# Patient Record
(demographics unavailable — no encounter records)

---

## 2025-06-23 NOTE — HISTORY OF PRESENT ILLNESS
[FreeTextEntry1] : June 23, 2025 Patient returns for follow up of Osteoporosis reclast 2/2022, 6/2023. 6/2024 Had fall in April, onto right lower back and hip pain with sitting, better with ambulation ambulating with cane Will try to obtain results from PMD, left message with office to discuss Patient is unable to log onto portal  Unclear if patient had repeat DEXA Restarted calcium and vitamin D Patient status post ankle replacement November 2022, exacerbated lymphedema since that time Was in wheelchair during that time +lymphedema Discussed calcium enriched diet, patient eats yogurt everyday No known family history of Osteoporosis, patient will reach out to her two sisters  February 7, 2024 Patient returns for follow up of osteoporosis Patent overall doing well. Continues reclast, completed 2/3 doses, due for next dose in June 2024 Tolerating well, no side effects noted. Ambulates with cane currently, during recovery used wheelchair and crutches for months Patient status post ankle replacement November 2022, exacerbated lymphedema since that time Wearing compression boots  Patient is currently taking levothyroxine, vitamin D3 Takes pain medication as needed No dental work planned, would like to have implants completed but not emergent need Discussed calcium enriched diet, patient eats yogurt everyday Discussed increasing walking and weight bearing exercises as tolerated y lymphedema and ankle pain  April 27, 2023 Patient returns for follow up Patient s/p left ankle reconstruction, 11/2022 In cam walker, lymphedema  Had surgery at [facility] Next follow up with surgeon in 11/2023 Walking but not as much as usual Reviewed bone density with decrease, which may be related to disuse as well as in cam walker given left ankle reconstruction surgery in 11/2022  Previous history: In 2009 had gastric bypass Reports history of osteoporosis, took alendronate in past, stopped in 2017, took for about 1-2 years PMD Fabrice Patricia, has ordered a repeat bone density DEXA in 2020: T score l spine 3.3 left femoral neck -2.3 Left hip -2.1

## 2025-06-23 NOTE — HISTORY OF PRESENT ILLNESS
[facility]  H&P  Name: Suhail Borrego 83 y.o. male I MRN: [id number]  Unit/Bed#: ED-39 I Date of Admission: 6/29/2024   Date of Service: 6/29/2024 I Hospital Day: 0      Assessment & Plan   * Lumbar compression fracture (HCC)  Assessment & Plan  PMHx of chronic back pain including T12 compression fracture and fusion of L2-S1 in 2018. Patient reports 1 week of worsening L sided back pain that feels like an achy muscle but is worse with standing/twisting or bending.     Tylenol 975 q8 OTC  Opioid naive pain management including Oxy 2.5 and Oxy 5 mg for pain  Bowel regimen due to constipation 2/2 tramadol outpatient  Continue gabapentin for neuropathic pain in feet  Neurosurgery consult for evaluation/management recommendations of new acute T11 compression fracture  PT/OT  Consider DEXA scan outpatient in setting of multiple pathologic fractures w/o trauma  Continue vitamin C and D supplementation    Stage 3 chronic kidney disease (HCC)  Assessment & Plan  Lab Results   Component Value Date    EGFR 57 06/29/2024    EGFR 59 05/19/2024    EGFR 56 05/02/2024    CREATININE 1.16 06/29/2024    CREATININE 1.14 05/19/2024    CREATININE 1.18 05/02/2024     Patient with chronic kidney disease IIIa. Avoid hypotension and nephrotoxins. Monitor on AM BMP.   IV hydration given s/p contrast for CTA dissection protocol.     Localized swelling of left lower extremity  Assessment & Plan  Per patient chronic, wears compression stocking on L side. Had LE US in 5/2024. Will repeat to ensure no interval DVT.     Primary hypertension  Assessment & Plan  BP stable. Continue home metoprolol 75 mg BID.    Hx of CABG  Assessment & Plan  History of CABG. Continue Aspirin and Statin.    Hyperlipidemia  Assessment & Plan  Continue home atorvastatin 80 mg    Leukocytosis  Assessment & Plan  Chronic since May 2024. Did not resolve with course of antibiotics. Monitor on CBC, follow up outpatient for further workup.         VTE Pharmacologic Prophylaxis: VTE Score: 4 Moderate Risk (Score 3-4) - Pharmacological DVT Prophylaxis Ordered: enoxaparin (Lovenox).  Code Status: Level 1 - Full Code  Discussion with family: Patient declined call to .     Anticipated Length of Stay: Patient will be admitted on an inpatient basis with an anticipated length of stay of greater than 2 midnights secondary to intractable back pain and acute T12 compression fracture.    Chief Complaint: Back pain    History of Present Illness:  Suhail Borrego is a 83 y.o. male with a PMH of HTN, HLD, PAD, CKD3, chronic low back pain including compression fracture of T12 in 2018 which healed on its own and lumbar spinal fusion L2-S1 in 2016 with Dr. Gil who presents with back pain. This admission with a T11 fracture.     Patient admitted 5/2/2024 for back pain. He was evaluated by neurosurgery at that time, who recommended conservative management. This was in the absence of acute fracture on CT from 3/29/2024. 5/6 was seen by pain management who initiated tramadol 50 mg BID PRN. Received bilateral L3-5 medial branch blocks with Dr. Braxton on 5/29/2024.     Today presented to ED with L lower back pain in the L lumbar region. Reports pain feels like muscle ache. Has been taking gabapentin and tramadol that was prescribed outpatient about 6 weeks prior to pain that he now associates with this fracture. Pain now refractory to medications. Reports pain is only when he stands up/walks, bends or twists.     In the ED, was given 250 mL bolus, and oxycodone 5 mg to good effect. Labwork remarkable for WBC of 10.58 (elevated 5/19/24), hgb 10.2 (baseline 10-11), and crt 1.16. CTA dissection protocol did not reveal acute dissection, however acute compression fracture at T11 vertebral body was noted with 10-20% of vertebral body height lost anteriorly.    Patient denies bowel or bladder incontinence, paresthesias in legs or groin. Has history of neuropathy in feet  for which he takes gabapentin for.     Headache, lightheadedness, palpitations, chest pain, N/V/D. Has low blood pressure at times.   Does have constipation, 3-4 days since last bowel movement. Only takes bowel regimen PRN. Also with chronic cough and shortness of breath. Has swelling in his left leg for which he wears a compression stocking. He was evaluated for DVT and none was found.     Review of Systems:  Review of Systems - As in HPI    Past Medical and Surgical History:   Past Medical History:   Diagnosis Date    Abnormal liver function test     RESOLVED: 14SEP2017    Allergic rhinitis     Anemia     LAST ASSESSED: 19OCT2017    Arthritis     BPH (benign prostatic hyperplasia)     CAD (coronary artery disease)     Coronary artery disease     Femoral artery stenosis (HCC)     LAST ASSESSED: 05OCT2017    Former tobacco use     GERD (gastroesophageal reflux disease)     Hand paresthesia     RESOLVED: 11SEP2017    Hyperlipidemia     Hypertension     Lumbar stenosis     Peripheral artery disease (HCC)     LAST ASSESSED: 27OCT2017    Stage 3a chronic kidney disease (HCC) 7/11/2021       Past Surgical History:   Procedure Laterality Date    BACK SURGERY      COLONOSCOPY      LUMBAR FUSION      IN ARTHRODESIS POSTERIOR/PSTLAT TQ 1NTRSPC LUMBAR N/A 11/03/2016    Procedure: L2-S1 POSTERIOR LUMBAR FUSION AND DECOMPRESSION (Impulse), Posterior lateral fixation; dural repair.;  Surgeon: Leslie Gil MD;  Location: [facility];  Service: Orthopedics    IN CABG W/ARTERIAL GRAFT SINGLE ARTERIAL GRAFT N/A 01/19/2018    Procedure: CABG X4 with LIMA - LAD, SVG - RCA, OM2, & Diagonal ; Left Leg EVH; MATTHEW;  Surgeon: Eric Bar DO;  Location: [facility];  Service: Cardiac Surgery    IN COLONOSCOPY FLX DX W/COLLJ SPEC WHEN PFRMD N/A 11/15/2017    Procedure: EGD AND COLONOSCOPY;  Surgeon: Mariano Godinez MD;  Location: [facility];  Service: Gastroenterology    SEPTOPLASTY      LAST ASSESSED; 13MAY2014    TONSILECTOMY AND  ADNOIDECTOMY      LAST ASSESSED: [id number]    UPPER GASTROINTESTINAL ENDOSCOPY         Meds/Allergies:  Prior to Admission medications    Medication Sig Start Date End Date Taking? Authorizing Provider   acetaminophen (TYLENOL) 650 mg CR tablet Take 650 mg by mouth every 8 (eight) hours as needed for mild pain    Historical Provider, MD   Ascorbic Acid (Vitamin C) 500 MG PACK Take 1,000 mg by mouth daily    Historical Provider, MD   aspirin (ECOTRIN LOW STRENGTH) 81 mg EC tablet Take 81 mg by mouth daily    Historical Provider, MD   atorvastatin (LIPITOR) 80 mg tablet TAKE 1 TABLET BY MOUTH EVERY DAY IN THE MORNING 2/8/24   Nile Nash MD   cholecalciferol (VITAMIN D3) 1,000 units tablet Take 2,000 Units by mouth daily    Historical Provider, MD   clotrimazole-betamethasone (LOTRISONE) 1-0.05 % cream Apply topically 2 (two) times a day 7/21/23   Aly Ziegler PA-C   cyanocobalamin (VITAMIN B-12) 1,000 mcg tablet Take 1,000 mcg by mouth daily      Historical Provider, MD   docusate sodium (COLACE) 100 mg capsule Take 100 mg by mouth 2 (two) times a day    Historical Provider, MD   famotidine (PEPCID) 20 mg tablet TAKE 1 TABLET BY MOUTH TWICE A DAY 6/16/24   Mikaela Duenas,    folic acid (FOLVITE) 1 mg tablet Take 1 tablet (1 mg total) by mouth daily 5/2/24   Phill Kinsey,    gabapentin (NEURONTIN) 100 mg capsule Take 1 capsule (100 mg total) by mouth daily at bedtime 5/23/24   More Mcdermott,    metoprolol tartrate (LOPRESSOR) 50 mg tablet Take 1.5 tablets (75 mg total) by mouth every 12 (twelve) hours 5/20/24   Nile Nash MD   Multiple Vitamin (MULTIVITAMIN) capsule Take 1 capsule by mouth daily    Historical Provider, MD   traMADol (Ultram) 50 mg tablet Take 1 tablet (50 mg total) by mouth 2 (two) times a day as needed for moderate pain 5/20/24   Eliazar Braxton MD     I have reviewed home medications with patient personally.    Allergies:   Allergies   Allergen Reactions    Penicillins  [FreeTextEntry1] : June 23, 2025 Patient returns for follow up of Osteoporosis reclast 2/2022, 6/2023. 6/2024 Had fall in April, onto right lower back and hip pain with sitting, better with ambulation ambulating with cane Will try to obtain results from PMD, left message with office to discuss Patient is unable to log onto portal  Unclear if patient had repeat DEXA Restarted calcium and vitamin D Patient status post ankle replacement November 2022, exacerbated lymphedema since that time Was in wheelchair during that time +lymphedema Discussed calcium enriched diet, patient eats yogurt everyday No known family history of Osteoporosis, patient will reach out to her two sisters  February 7, 2024 Patient returns for follow up of osteoporosis Patent overall doing well. Continues reclast, completed 2/3 doses, due for next dose in June 2024 Tolerating well, no side effects noted. Ambulates with cane currently, during recovery used wheelchair and crutches for months Patient status post ankle replacement November 2022, exacerbated lymphedema since that time Wearing compression boots  Patient is currently taking levothyroxine, vitamin D3 Takes pain medication as needed No dental work planned, would like to have implants completed but not emergent need Discussed calcium enriched diet, patient eats yogurt everyday Discussed increasing walking and weight bearing exercises as tolerated y lymphedema and ankle pain  April 27, 2023 Patient returns for follow up Patient s/p left ankle reconstruction, 11/2022 In cam walker, lymphedema  Had surgery at [facility] Next follow up with surgeon in 11/2023 Walking but not as much as usual Reviewed bone density with decrease, which may be related to disuse as well as in cam walker given left ankle reconstruction surgery in 11/2022  Previous history: In 2009 had gastric bypass Reports history of osteoporosis, took alendronate in past, stopped in 2017, took for about 1-2 years PMD Fabrice Patricia, has ordered a repeat bone density DEXA in 2020: T score l spine 3.3 left femoral neck -2.3 Left hip -2.1  Other (See Comments)     Hallucinations;  Patient reported that he was seeing visual disturbances         Social History:  Marital Status:    Occupation: retired  Patient Pre-hospital Living Situation: Home, Alone  Patient Pre-hospital Level of Mobility:  Rolator, one on each floor with stair lifts in house  Patient Pre-hospital Diet Restrictions: gets meals to his house  Substance Use History:   Social History     Substance and Sexual Activity   Alcohol Use Not Currently    Alcohol/week: 1.0 standard drink of alcohol    Types: 1 Shots of liquor per week    Comment: beer, wine, scotch every day; SOCIAL AS PER ALL SCRIPTS      Social History     Tobacco Use   Smoking Status Former    Current packs/day: 0.00    Average packs/day: 1 pack/day for 50.0 years (50.0 ttl pk-yrs)    Types: Cigarettes    Start date:     Quit date:     Years since quittin.5   Smokeless Tobacco Never     Social History     Substance and Sexual Activity   Drug Use Not Currently    Types: Marijuana    Comment: medical clarke     Family History:  Family History   Problem Relation Age of Onset    Emphysema Mother     Liver disease Mother     Coronary artery disease Father     Hypertension Father     Liver disease Father     Heart failure Father     Stroke Father         CVA     Heart attack Father     Coronary artery disease Brother     Heart disease Brother         younger brother by pass and other brother 4 stents placed    Other Family         BACK PROBLEM     Stroke Family         CVA    Emphysema Family     Hypertension Family         BENIGN       Physical Exam:     Vitals:   Blood Pressure: 140/68 (24 0430)  Pulse: 74 (24 0430)  Temperature: 98.1 °F (36.7 °C) (24 0029)  Temp Source: Oral (24 0029)  Respirations: 16 (24 0430)  Weight - Scale: 67.5 kg (148 lb 13 oz) (24 0029)  SpO2: 96 % (24 0430)    Physical Exam  Vitals and nursing note reviewed.   Constitutional:       General:  He is not in acute distress.     Appearance: Normal appearance. He is not ill-appearing.      Comments: Comfortable seated in bed   HENT:      Head: Normocephalic and atraumatic.      Nose: Nose normal.      Mouth/Throat:      Mouth: Mucous membranes are moist.   Eyes:      Pupils: Pupils are equal, round, and reactive to light.   Cardiovascular:      Rate and Rhythm: Normal rate and regular rhythm.      Pulses: Normal pulses.      Heart sounds: Normal heart sounds. No murmur heard.  Pulmonary:      Effort: Pulmonary effort is normal. No respiratory distress.      Breath sounds: Normal breath sounds. No wheezing or rales.   Abdominal:      General: Bowel sounds are normal. There is no distension.      Palpations: Abdomen is soft.      Tenderness: There is no abdominal tenderness.   Musculoskeletal:      Comments: L>R KETAN. Mild.  No TTP over central spine or surrounding musculature of back. Some muscle tightness to palpation bilaterally.    Skin:     General: Skin is warm and dry.      Capillary Refill: Capillary refill takes less than 2 seconds.   Neurological:      General: No focal deficit present.      Mental Status: He is alert and oriented to person, place, and time. Mental status is at baseline.      Sensory: No sensory deficit.      Motor: No weakness.   Psychiatric:         Mood and Affect: Mood normal.         Behavior: Behavior normal.        Additional Data:   Lab Results:  Results from last 7 days   Lab Units 06/29/24  0153   WBC Thousand/uL 10.58*   HEMOGLOBIN g/dL 10.2*   HEMATOCRIT % 32.3*   PLATELETS Thousands/uL 184   SEGS PCT % 62   LYMPHO PCT % 25   MONO PCT % 9   EOS PCT % 4     Results from last 7 days   Lab Units 06/29/24  0153   SODIUM mmol/L 138   POTASSIUM mmol/L 4.6   CHLORIDE mmol/L 107   CO2 mmol/L 26   BUN mg/dL 31*   CREATININE mg/dL 1.16   ANION GAP mmol/L 5   CALCIUM mg/dL 9.0   ALBUMIN g/dL 3.5   TOTAL BILIRUBIN mg/dL 0.34   ALK PHOS U/L 61   ALT U/L 14   AST U/L 20   GLUCOSE RANDOM  mg/dL 107     Results from last 7 days   Lab Units 06/29/24  0153   INR  1.08         Lab Results   Component Value Date    HGBA1C 5.2 06/29/2020    HGBA1C 5.3 04/08/2019    HGBA1C 5.3 09/14/2017     Results from last 7 days   Lab Units 06/29/24  0153   LACTIC ACID mmol/L 1.2     Lines/Drains:  Invasive Devices       Peripheral Intravenous Line  Duration             Peripheral IV 06/29/24 Left Antecubital <1 day                  Imaging: Reviewed radiology reports from this admission including: chest CT scan  CTA dissection protocol chest/abdomen/pelvis   Final Result by Gary Ramirez MD (06/29 0414)      No evidence of aortic aneurysm or dissection      Acute compression fracture at the T11 vertebral body with approximately 10 to 20% vertebral body height loss anteriorly..      Worsening findings of interstitial lung disease comparing to 10/16/2021               Workstation performed: [id number]         CT recon only thoracolumbar   Final Result by Gary Ramirez MD (06/29 0426)      New acute compression fracture at the T11 vertebral body anteriorly with approximately 10 to 20% loss of body height..               Workstation performed: [id number]         XR chest 1 view portable   ED Interpretation by Adalberto Dowling MD (06/29 0228)   No acute cardiopulmonary process.  Interpreted by me.       VAS VENOUS DUPLEX - LOWER LIMB BILATERAL    (Results Pending)     EKG and Other Studies Reviewed on Admission:   EKG: NSR. HR 74.    Anu You MD  6/29/2024, 6:25 AM  PGY-1 [facility]

## 2025-06-23 NOTE — DATA REVIEWED
[FreeTextEntry1] : Addendum:  11/2024	 EXAM:  DX DEXA AXIAL  HISTORY:  Evaluate bone density. Female; Postmenopausal.   TECHNIQUE: The study was done on a Hologic densitometer.  FINDINGS:   AP Lumbar spine L1-L4 T-score 3.0. Z-score 5.2. Left femoral neck T-score -2.7. Z-score -0.9. Left total hip T-score -2.5. Z-score -0.9. No gross lumbar spine structural abnormality. No gross hip structural abnormality.      EXAM:  MRI PELVIS WITHOUT CONTRAST  HISTORY:  Pelvic/lumbar pain   TECHNIQUE:  MRI of the osseous pelvis was performed with multiplanar, multisequential MR imaging. No intravenous or intra-articular contrast was administered.  COMPARISON:  X-ray 5/7/2025, concurrent MR lumbar spine.   FINDINGS:  Osseous structures:  Bilateral sacral insufficiency fractures would diffuse edema in the longitudinal fracture lines in the sacral ala.  Edema with a hypointense line in the left pubic body compatible with a additional stress fracture.  Hip joints:  Mild bilateral hip arthrosis with mild chondral wear and minimal osteophyte formation.  No significant joint effusion.  Suspect bilateral superior labral tears.  Sacroiliac joints: Sacroiliac joints are symmetric noting the subchondral edema is related to the insufficiency fractures. No discrete erosions.   Tendons:  Partial tearing of the bilateral gluteus minimus insertions.  No trochanteric bursitis. Normal iliopsoas tendons without tendinosis, peritendinitis, or tear. Partial tear of the left rectus femoris origin involving the direct head. No iliopsoas bursitis. High-grade, near-complete tearing of the right hamstring tendon, with up to 4.5 cm of the torn fibers and fluid in the retracted tendon gap. Focal moderate grade interstitial tear of the left semimembranosus component of the hamstring tendon.  Adductor origins and rectus abdominis insertions on pubic symphysis are grossly intact.  Ischiofemoral fossae:  No narrowing of the ischiofemoral spaces. No edema in the quadratus femoris muscles.  Inguinal canals:  No inguinal hernias.  Included lumbar spine and sacrum: Degenerative disc disease with disc desiccation mild disc bulges in the included lower lumbar spine. Grade 1 anterolisthesis of L4 over L5. Facet arthrosis in the lower lumbar spine. See separate MRI lumbar spine for further characterization.   Muscles and nerves: Fatty atrophy of the bilateral gluteus minimus  Visualized sciatic and femoral nerves are unremarkable.  Soft tissues: Intrapelvic contents are grossly unremarkable.  No lymph node enlargement. No free fluid in the pelvis.  IMPRESSION:   1.  Bilateral sacral ala and left pubic body insufficiency fractures. 2.  High-grade, near complete partial tear of the right hamstring tendon. Focal moderate grade partial interstitial tear of the left hamstring tendinosis and minimal retrolisthesis component. 3.  Partial tear of the left rectus femoris origin direct head and bilateral gluteus minimus insertions. 4.  Mild bilateral hip arthrosis. Suspect bilateral superior labral tears. 5.  Degenerative disc disease and facet arthrosis of the lower lumbar spine; see separately dictated MR lumbar spine.      Thank you for the opportunity to participate in the care of this patient.    LARRY ELLIOTT MD - Electronically Signed: 06- 8:55 AM       EXAM:  MRI LUMBAR SPINE WITHOUT CONTRAST  HISTORY: Status post fall one week prior. Low back pain.  TECHNIQUE: Multiplanar, multi-sequential MRI of the lumbar spine was obtained on a 1.5T scanner using a standard protocol.  COMPARISON:  MRI lumbar spine 10/15/2013.  FINDINGS: Interval development of diffuse, abnormal, bilateral sacral and abnormal left L5 pedicle and left posterior elements marrow edema. Findings compatible with bilateral sacral and left L5 pedicle fractures, given patient's clinical history. Mild bilateral presacral soft tissue swelling is also present. However, interval follow-up MRI lumbar spine or MRI sacrococcyx is suggested for evaluation of evolution to chronicity and to rule out possibility of neoplastic marrow infiltration.  No included distal thoracic or other lumbar spine fractures demonstrated. No destructive marrow processes.  For purposes of this dictation, the last well-formed disc space will be labeled L5-S1. Increased, moderate levoscoliosis thoracolumbar spine apex at L1-2. Normal lumbar lordosis.  Interval, grade 1 retrolisthesis T12-L1, grade 1 retrolisthesis L1-2, grade 1 retrolisthesis L2-3, redemonstrated, grade 1 anterolisthesis L4-5. No definite spondylolysis within the limitations of MRI imaging.  Stable, mild T12-L1, increased, severe L1-2, redemonstrated, L2-3 through L5-S1 disc desiccation and mild loss of L4-5 disc height.  Interval, chronic appearing, L1-2 endplate deformities/Schmorl's nodes and interval, Modic type II endplate marrow change.  Conus medullaris is at L1. Distal thoracic spinal cord and conus medullaris are unremarkable. No intraspinal masses. Posterior lower paraspinal muscle atrophy with fatty infiltration is seen. Paraspinal soft tissues are otherwise unremarkable.  T12-L1: Partially included, interval, uncovering of the disc by spondylolisthesis. Interval disc bulging with superimposed left-sided disc herniation with mild inferior migration (Key image provided). Mild thecal sac flattening. No spinal stenosis. No significant foraminal narrowing.  L1-2: Interval, uncovering of the disc by spondylolisthesis. Increased disc bulging with increased thecal sac flattening and interval, lateral recess spinal stenosis. Increased, moderate to severe right-sided foraminal narrowing. Left neural foramen patent.  L2-3: Interval, uncovering of the disc by spondylolisthesis. Increased disc bulging with interval development of small, right paracentral disc herniation (axial image 9, series 5). Increased right lateral recess spinal stenosis. Redemonstrated, severe left and increased mild/moderate right-sided facet arthropathy. No significant foraminal narrowing narrowing bilaterally.  L3-4: Redemonstrated, disc bulging with superimposed small shallow central disc herniation, developmentally shortened pedicles, severe bilateral facet arthropathy, thecal sac flattening and moderate severe central acquired and developmental spinal stenosis. Redemonstrated, mild right sided foraminal narrowing. Left neural foramen patent.  L4-5: Redemonstrated, uncovering of the disc by spondylolisthesis. Redemonstrated, disc bulging, interval, small, right foraminal annulus fissure, developmentally shortened pedicles, severe/marked bilateral facet arthropathy, ligamentum flavum hypertrophy and moderate severe acquired developmental spinal stenosis. Stable mild right greater left-sided foraminal narrowing.  L5-S1: Redemonstrated, disc bulging with interval, broad-based left foraminal disc herniation, increased left-sided foraminal narrowing and new impingement on the exiting left L5 nerve root sheath. Right neural foramen is patent. Mild thecal sac flattening. Increased, severe bilateral facet arthropathy and interval, small facet joint effusions bilaterally.   IMPRESSION: 1. Interval development of diffuse abnormal bilateral sacral and left L5 pedicle left posterior L5-S1 element marrow edema compatible with acute bilateral sacral and left L5 pedicle posttraumatic and/or insufficiency fractures, given patient's history of fall. Interval follow-up MRI lumbar spine or MRI of the sacrum without gadolinium to ensure evolution of these fractures to chronicity. 2. Interval development of degenerative grade 1 retrolisthesis T12-L1, degenerative grade 1 retrolisthesis L1-2, degenerative grade 1 retrolisthesis L2-3 and redemonstrated, degenerative grade 1 anterolisthesis L4-5. 3. Increased, severe L1-2 spondylosis, increased L1-2 disc bulge with thecal sac flattening, interval lateral recess spinal stenosis, increased moderate to severe right-sided foraminal narrowing. 4. Partially included, interval T12-L1 disc bulge with superimposed left-sided disc herniation with mild intermigration and mild thecal sac flattening. 5. Interval increase, L2-3 disc bulge with interval, small right paracentral disc herniation, increased right lateral recess spinal stenosis, stable severe left and increased mild/moderate right-sided facet arthropathy. 6. Redemonstrated, L5-S1 disc bulge, interval, broad-based left foraminal disc herniation, increased left-sided foraminal narrowing and new impingement exiting left L5 nerve root sheath, increased, severe bilateral facet arthropathy. 7. Stable, moderate to severe L3-4 and L4-5 acquired and developmental spinal stenosis as above. 8. Increased, moderate levoscoliosis.    Thank you for the opportunity to participate in the care of this patient.    MICHAEL TAYLOR MD - Electronically Signed: 06- 5:56 AM  Physician to Physician Direct Line is: [phone]

## 2025-06-23 NOTE — REVIEW OF SYSTEMS
[Joint Pain] : joint pain [Negative] : Genitourinary [FreeTextEntry9] : right hip and right lower back

## 2025-06-23 NOTE — DATA REVIEWED
[FreeTextEntry1] : Addendum:  11/2024	 EXAM:  DX DEXA AXIAL  HISTORY:  Evaluate bone density. Female; Postmenopausal.   TECHNIQUE: The study was done on a Hologic densitometer.  FINDINGS:   AP Lumbar spine L1-L4 T-score 3.0. Z-score 5.2. Left femoral neck T-score -2.7. Z-score -0.9. Left total hip T-score -2.5. Z-score -0.9. No gross lumbar spine structural abnormality. No gross hip structural abnormality.      EXAM:  MRI PELVIS WITHOUT CONTRAST  HISTORY:  Pelvic/lumbar pain   TECHNIQUE:  MRI of the osseous pelvis was performed with multiplanar, multisequential MR imaging. No intravenous or intra-articular contrast was administered.  COMPARISON:  X-ray 5/7/2025, concurrent MR lumbar spine.   FINDINGS:  Osseous structures:  Bilateral sacral insufficiency fractures would diffuse edema in the longitudinal fracture lines in the sacral ala.  Edema with a hypointense line in the left pubic body compatible with a additional stress fracture.  Hip joints:  Mild bilateral hip arthrosis with mild chondral wear and minimal osteophyte formation.  No significant joint effusion.  Suspect bilateral superior labral tears.  Sacroiliac joints: Sacroiliac joints are symmetric noting the subchondral edema is related to the insufficiency fractures. No discrete erosions.   Tendons:  Partial tearing of the bilateral gluteus minimus insertions.  No trochanteric bursitis. Normal iliopsoas tendons without tendinosis, peritendinitis, or tear. Partial tear of the left rectus femoris origin involving the direct head. No iliopsoas bursitis. High-grade, near-complete tearing of the right hamstring tendon, with up to 4.5 cm of the torn fibers and fluid in the retracted tendon gap. Focal moderate grade interstitial tear of the left semimembranosus component of the hamstring tendon.  Adductor origins and rectus abdominis insertions on pubic symphysis are grossly intact.  Ischiofemoral fossae:  No narrowing of the ischiofemoral spaces. No edema in the quadratus femoris muscles.  Inguinal canals:  No inguinal hernias.  Included lumbar spine and sacrum: Degenerative disc disease with disc desiccation mild disc bulges in the included lower lumbar spine. Grade 1 anterolisthesis of L4 over L5. Facet arthrosis in the lower lumbar spine. See separate MRI lumbar spine for further characterization.   Muscles and nerves: Fatty atrophy of the bilateral gluteus minimus  Visualized sciatic and femoral nerves are unremarkable.  Soft tissues: Intrapelvic contents are grossly unremarkable.  No lymph node enlargement. No free fluid in the pelvis.  IMPRESSION:   1.  Bilateral sacral ala and left pubic body insufficiency fractures. 2.  High-grade, near complete partial tear of the right hamstring tendon. Focal moderate grade partial interstitial tear of the left hamstring tendinosis and minimal retrolisthesis component. 3.  Partial tear of the left rectus femoris origin direct head and bilateral gluteus minimus insertions. 4.  Mild bilateral hip arthrosis. Suspect bilateral superior labral tears. 5.  Degenerative disc disease and facet arthrosis of the lower lumbar spine; see separately dictated MR lumbar spine.      Thank you for the opportunity to participate in the care of this patient.    LARRY ELLIOTT MD - Electronically Signed: 06- 8:55 AM       EXAM:  MRI LUMBAR SPINE WITHOUT CONTRAST  HISTORY: Status post fall one week prior. Low back pain.  TECHNIQUE: Multiplanar, multi-sequential MRI of the lumbar spine was obtained on a 1.5T scanner using a standard protocol.  COMPARISON:  MRI lumbar spine 10/15/2013.  FINDINGS: Interval development of diffuse, abnormal, bilateral sacral and abnormal left L5 pedicle and left posterior elements marrow edema. Findings compatible with bilateral sacral and left L5 pedicle fractures, given patient's clinical history. Mild bilateral presacral soft tissue swelling is also present. However, interval follow-up MRI lumbar spine or MRI sacrococcyx is suggested for evaluation of evolution to chronicity and to rule out possibility of neoplastic marrow infiltration.  No included distal thoracic or other lumbar spine fractures demonstrated. No destructive marrow processes.  For purposes of this dictation, the last well-formed disc space will be labeled L5-S1. Increased, moderate levoscoliosis thoracolumbar spine apex at L1-2. Normal lumbar lordosis.  Interval, grade 1 retrolisthesis T12-L1, grade 1 retrolisthesis L1-2, grade 1 retrolisthesis L2-3, redemonstrated, grade 1 anterolisthesis L4-5. No definite spondylolysis within the limitations of MRI imaging.  Stable, mild T12-L1, increased, severe L1-2, redemonstrated, L2-3 through L5-S1 disc desiccation and mild loss of L4-5 disc height.  Interval, chronic appearing, L1-2 endplate deformities/Schmorl's nodes and interval, Modic type II endplate marrow change.  Conus medullaris is at L1. Distal thoracic spinal cord and conus medullaris are unremarkable. No intraspinal masses. Posterior lower paraspinal muscle atrophy with fatty infiltration is seen. Paraspinal soft tissues are otherwise unremarkable.  T12-L1: Partially included, interval, uncovering of the disc by spondylolisthesis. Interval disc bulging with superimposed left-sided disc herniation with mild inferior migration (Key image provided). Mild thecal sac flattening. No spinal stenosis. No significant foraminal narrowing.  L1-2: Interval, uncovering of the disc by spondylolisthesis. Increased disc bulging with increased thecal sac flattening and interval, lateral recess spinal stenosis. Increased, moderate to severe right-sided foraminal narrowing. Left neural foramen patent.  L2-3: Interval, uncovering of the disc by spondylolisthesis. Increased disc bulging with interval development of small, right paracentral disc herniation (axial image 9, series 5). Increased right lateral recess spinal stenosis. Redemonstrated, severe left and increased mild/moderate right-sided facet arthropathy. No significant foraminal narrowing narrowing bilaterally.  L3-4: Redemonstrated, disc bulging with superimposed small shallow central disc herniation, developmentally shortened pedicles, severe bilateral facet arthropathy, thecal sac flattening and moderate severe central acquired and developmental spinal stenosis. Redemonstrated, mild right sided foraminal narrowing. Left neural foramen patent.  L4-5: Redemonstrated, uncovering of the disc by spondylolisthesis. Redemonstrated, disc bulging, interval, small, right foraminal annulus fissure, developmentally shortened pedicles, severe/marked bilateral facet arthropathy, ligamentum flavum hypertrophy and moderate severe acquired developmental spinal stenosis. Stable mild right greater left-sided foraminal narrowing.  L5-S1: Redemonstrated, disc bulging with interval, broad-based left foraminal disc herniation, increased left-sided foraminal narrowing and new impingement on the exiting left L5 nerve root sheath. Right neural foramen is patent. Mild thecal sac flattening. Increased, severe bilateral facet arthropathy and interval, small facet joint effusions bilaterally.   IMPRESSION: 1. Interval development of diffuse abnormal bilateral sacral and left L5 pedicle left posterior L5-S1 element marrow edema compatible with acute bilateral sacral and left L5 pedicle posttraumatic and/or insufficiency fractures, given patient's history of fall. Interval follow-up MRI lumbar spine or MRI of the sacrum without gadolinium to ensure evolution of these fractures to chronicity. 2. Interval development of degenerative grade 1 retrolisthesis T12-L1, degenerative grade 1 retrolisthesis L1-2, degenerative grade 1 retrolisthesis L2-3 and redemonstrated, degenerative grade 1 anterolisthesis L4-5. 3. Increased, severe L1-2 spondylosis, increased L1-2 disc bulge with thecal sac flattening, interval lateral recess spinal stenosis, increased moderate to severe right-sided foraminal narrowing. 4. Partially included, interval T12-L1 disc bulge with superimposed left-sided disc herniation with mild intermigration and mild thecal sac flattening. 5. Interval increase, L2-3 disc bulge with interval, small right paracentral disc herniation, increased right lateral recess spinal stenosis, stable severe left and increased mild/moderate right-sided facet arthropathy. 6. Redemonstrated, L5-S1 disc bulge, interval, broad-based left foraminal disc herniation, increased left-sided foraminal narrowing and new impingement exiting left L5 nerve root sheath, increased, severe bilateral facet arthropathy. 7. Stable, moderate to severe L3-4 and L4-5 acquired and developmental spinal stenosis as above. 8. Increased, moderate levoscoliosis.    Thank you for the opportunity to participate in the care of this patient.    MICHAEL TAYLOR MD - Electronically Signed: 06- 5:56 AM  Physician to Physician Direct Line is: [phone]

## 2025-06-23 NOTE — PHYSICAL EXAM
[General Appearance - Alert] : alert [General Appearance - In No Acute Distress] : in no acute distress [General Appearance - Well-Appearing] : healthy appearing [Sclera] : the sclera and conjunctiva were normal [] : no respiratory distress [Respiration, Rhythm And Depth] : normal respiratory rhythm and effort [Exaggerated Use Of Accessory Muscles For Inspiration] : no accessory muscle use [Affect] : the affect was normal [Mood] : the mood was normal [FreeTextEntry1] : ambulates with cane

## 2025-06-23 NOTE — ASSESSMENT
[FreeTextEntry1] : 72 year old woman returns for follow up of osteoporosis.  Patient with history of osteoporosis, with T score of the femoral neck of -2.7, completed course of zoledronic acid x 3 doses from 2022-June 2024.  Discussed previous diagnosis of osteoporosis with patient, patient is unclear if had repeat bone density completed, following with orthopedist as well as primary care doctor regarding right hip and right lower back pain status post fall.  Patient reports fracture but results are not available to me at this time, patient unable to logon to portal and is unsure of which location had the test.  Placed call to primary care doctor to discuss further.  Possible anabolic agent for osteoporosis treatment given recent fall with fracture.  Will update labs today in office including CMP, PTH vitamin D, magnesium, phosphorus collagen 1 telopeptide and TSH. Discussed increasing calcium enriched foods, patient continues calcium and vitamin D supplementation, weightbearing exercises as tolerated and approved by orthopedist.  Further management pending discussion with primary care doctor.